# Patient Record
Sex: MALE | Race: WHITE | NOT HISPANIC OR LATINO | Employment: FULL TIME | URBAN - METROPOLITAN AREA
[De-identification: names, ages, dates, MRNs, and addresses within clinical notes are randomized per-mention and may not be internally consistent; named-entity substitution may affect disease eponyms.]

---

## 2021-12-20 ENCOUNTER — NON-PROVIDER VISIT (OUTPATIENT)
Dept: URGENT CARE | Facility: CLINIC | Age: 26
End: 2021-12-20

## 2021-12-20 DIAGNOSIS — Z02.1 PRE-EMPLOYMENT DRUG SCREENING: ICD-10-CM

## 2021-12-20 LAB
AMP AMPHETAMINE: NORMAL
BAR BARBITURATES: NORMAL
BZO BENZODIAZEPINES: NORMAL
COC COCAINE: NORMAL
INT CON NEG: NEGATIVE
INT CON POS: POSITIVE
MDMA ECSTASY: NORMAL
MET METHAMPHETAMINES: NORMAL
MTD METHADONE: NORMAL
OPI OPIATES: NORMAL
OXY OXYCODONE: NORMAL
PCP PHENCYCLIDINE: NORMAL
POC URINE DRUG SCREEN OCDRS: NEGATIVE
THC: NORMAL

## 2021-12-20 PROCEDURE — 80305 DRUG TEST PRSMV DIR OPT OBS: CPT | Performed by: EMERGENCY MEDICINE

## 2022-04-08 ENCOUNTER — OFFICE VISIT (OUTPATIENT)
Dept: URGENT CARE | Facility: PHYSICIAN GROUP | Age: 27
End: 2022-04-08

## 2022-04-08 VITALS
WEIGHT: 132.8 LBS | TEMPERATURE: 98.2 F | OXYGEN SATURATION: 98 % | BODY MASS INDEX: 20.84 KG/M2 | DIASTOLIC BLOOD PRESSURE: 70 MMHG | SYSTOLIC BLOOD PRESSURE: 110 MMHG | RESPIRATION RATE: 16 BRPM | HEIGHT: 67 IN | HEART RATE: 89 BPM

## 2022-04-08 DIAGNOSIS — R10.9 INTERMITTENT ABDOMINAL PAIN: ICD-10-CM

## 2022-04-08 DIAGNOSIS — B96.89 ACUTE BACTERIAL SINUSITIS: ICD-10-CM

## 2022-04-08 DIAGNOSIS — J01.90 ACUTE BACTERIAL SINUSITIS: ICD-10-CM

## 2022-04-08 DIAGNOSIS — R19.7 INTERMITTENT DIARRHEA: ICD-10-CM

## 2022-04-08 DIAGNOSIS — J34.89 NASAL VESTIBULITIS: ICD-10-CM

## 2022-04-08 PROCEDURE — 99204 OFFICE O/P NEW MOD 45 MIN: CPT | Performed by: FAMILY MEDICINE

## 2022-04-08 RX ORDER — DOXYCYCLINE HYCLATE 100 MG
100 TABLET ORAL 2 TIMES DAILY
Qty: 14 TABLET | Refills: 0 | Status: SHIPPED | OUTPATIENT
Start: 2022-04-08 | End: 2022-04-15

## 2022-04-08 NOTE — PROGRESS NOTES
Chief Complaint:    Chief Complaint   Patient presents with   • Congestion     NASAL CONGESTION     • Fatigue   • Abdominal Pain     X has been getting them for the last 2 months about every 2 weeks lasting 4-5 days         History of Present Illness:    Scabbed area just at opening of right nare x 1 week, not getting better. Nasal congestion with purulent mucus from nose x about 5 days, not getting better. Fatigue, sore throat, and cough x 3-4 days - all these symptoms are improving. Fever up to 100 F past 2 days.     For 2 months, having intermittent abdominal pain and diarrhea (can be about 5 episodes/day). GI symptoms can be better x 1 week, then worse x 1 week. No personal or family history of GI problems.      Past Medical History:    History reviewed. No pertinent past medical history.    Past Surgical History:    History reviewed. No pertinent surgical history.    Social History:    Social History     Socioeconomic History   • Marital status: Single     Spouse name: Not on file   • Number of children: Not on file   • Years of education: Not on file   • Highest education level: Not on file   Occupational History   • Not on file   Tobacco Use   • Smoking status: Current Every Day Smoker     Packs/day: 0.03     Years: 1.00     Pack years: 0.03     Types: Cigarettes   • Smokeless tobacco: Never Used   Substance and Sexual Activity   • Alcohol use: Yes     Comment: occ   • Drug use: Yes     Frequency: 14.0 times per week     Types: Marijuana   • Sexual activity: Not on file   Other Topics Concern   • Not on file   Social History Narrative   • Not on file     Social Determinants of Health     Financial Resource Strain: Not on file   Food Insecurity: Not on file   Transportation Needs: Not on file   Physical Activity: Not on file   Stress: Not on file   Social Connections: Not on file   Intimate Partner Violence: Not on file   Housing Stability: Not on file     Family History:    History reviewed. No pertinent  "family history.    Medications:    No current outpatient medications on file prior to visit.     No current facility-administered medications on file prior to visit.     Allergies:    No Known Allergies      Vitals:    Vitals:    22 1156   BP: 110/70   Pulse: 89   Resp: 16   Temp: 36.8 °C (98.2 °F)   TempSrc: Temporal   SpO2: 98%   Weight: 60.2 kg (132 lb 12.8 oz)   Height: 1.702 m (5' 7\")       Physical Exam:    Constitutional: Vital signs reviewed. Appears well-developed and well-nourished. No acute distress.   Eyes: Sclera white, conjunctivae clear.   ENT: TTP right maxillary sinus region. At opening of right nare, septal aspect, scabbed sore with mild surrounding erythema. External ears normal. External auditory canals normal without discharge. TMs translucent and non-bulging. Hearing normal. Lips/teeth are normal. Oral mucosa pink and moist. Posterior pharynx: WNL.  Neck: Neck supple.   Cardiovascular: Regular rate and rhythm. No murmur.  Pulmonary/Chest: Respirations non-labored. Clear to auscultation bilaterally.  Abdomen: Bowel sounds are normal active. Soft, non-distended, and non-tender to palpation.  Musculoskeletal: Normal gait. No muscular atrophy or weakness.  Neurological: Alert and oriented to person, place, and time. Muscle tone normal. Coordination normal.   Skin: No rashes or lesions. Warm, dry, normal turgor.  Psychiatric: Normal mood and affect. Behavior is normal. Judgment and thought content normal.       Medical Decision Makin. Nasal vestibulitis  - doxycycline (VIBRAMYCIN) 100 MG Tab; Take 1 Tablet by mouth 2 times a day for 7 days.  Dispense: 14 Tablet; Refill: 0  - mupirocin (BACTROBAN) 2 % Ointment; APPLY TO SORE OR WOUND 3 TIMES A DAY UNTIL HEALED.  Dispense: 22 g; Refill: 3    2. Acute bacterial sinusitis  - doxycycline (VIBRAMYCIN) 100 MG Tab; Take 1 Tablet by mouth 2 times a day for 7 days.  Dispense: 14 Tablet; Refill: 0    3. Intermittent abdominal pain  - Referral to " Gastroenterology    4. Intermittent diarrhea  - Referral to Gastroenterology      Work note given - excuse for 4/7/22.    Discussed with him DDX, management options, and risks, benefits, and alternatives to treatment plan agreed upon.    Scabbed area just at opening of right nare x 1 week, not getting better. Nasal congestion with purulent mucus from nose x about 5 days, not getting better. Fatigue, sore throat, and cough x 3-4 days - all these symptoms are improving. Fever up to 100 F past 2 days.     For 2 months, having intermittent abdominal pain and diarrhea (can be about 5 episodes/day). GI symptoms can be better x 1 week, then worse x 1 week. No personal or family history of GI problems.    TTP right maxillary sinus region. At opening of right nare, septal aspect, scabbed sore with mild surrounding erythema.    ? etiology of intermittent abdominal pain and diarrhea x 2 months.    Agreeable to medications prescribed and referral to Gastroenterology ordered.    Discussed expected course of duration, time for improvement, and to seek follow-up in Emergency Room, urgent care, or with PCP if getting worse at any time or not improving within expected time frame.

## 2022-04-08 NOTE — LETTER
April 8, 2022         Patient: Keny Castellanos   YOB: 1995   Date of Visit: 4/8/2022           To Whom it May Concern:    Keny Castellanos was seen in my clinic on 4/8/2022.     Please excuse from work for 4/7/22 due to medical condition.    If you have any questions or concerns, please don't hesitate to call.        Sincerely,           Corby Butler M.D.  Electronically Signed

## 2022-07-07 ENCOUNTER — OCCUPATIONAL MEDICINE (OUTPATIENT)
Dept: URGENT CARE | Facility: PHYSICIAN GROUP | Age: 27
End: 2022-07-07
Payer: COMMERCIAL

## 2022-07-07 ENCOUNTER — APPOINTMENT (OUTPATIENT)
Dept: URGENT CARE | Facility: PHYSICIAN GROUP | Age: 27
End: 2022-07-07
Payer: COMMERCIAL

## 2022-07-07 VITALS
OXYGEN SATURATION: 98 % | SYSTOLIC BLOOD PRESSURE: 118 MMHG | DIASTOLIC BLOOD PRESSURE: 80 MMHG | WEIGHT: 130 LBS | BODY MASS INDEX: 24.55 KG/M2 | HEART RATE: 66 BPM | HEIGHT: 61 IN | TEMPERATURE: 97.6 F | RESPIRATION RATE: 14 BRPM

## 2022-07-07 DIAGNOSIS — T15.02XA FOREIGN BODY OF LEFT CORNEA, INITIAL ENCOUNTER: ICD-10-CM

## 2022-07-07 PROCEDURE — 99214 OFFICE O/P EST MOD 30 MIN: CPT | Performed by: PHYSICIAN ASSISTANT

## 2022-07-07 RX ORDER — OFLOXACIN 3 MG/ML
1 SOLUTION/ DROPS OPHTHALMIC 4 TIMES DAILY
Qty: 10 ML | Refills: 0 | Status: SHIPPED | OUTPATIENT
Start: 2022-07-07 | End: 2022-07-14

## 2022-07-07 ASSESSMENT — ENCOUNTER SYMPTOMS
EYE REDNESS: 1
HEADACHES: 0
PHOTOPHOBIA: 1
DOUBLE VISION: 0
FEVER: 0
EYE PAIN: 1
NAUSEA: 0
VOMITING: 0
DIZZINESS: 0
BLURRED VISION: 0

## 2022-07-07 NOTE — LETTER
"EMPLOYEE’S CLAIM FOR COMPENSATION/ REPORT OF INITIAL TREATMENT  FORM C-4    EMPLOYEE’S CLAIM - PROVIDE ALL INFORMATION REQUESTED   First Name  Keny Last Name  Emanuel Birthdate                    1995                Sex  male Claim Number (Insurer’s Use Only)    Home Address  [address] Age  26 y.o. Height  1.549 m (5' 1\") Weight  59 kg (130 lb) [facility]     [place] Zip  [address] Telephone  [phone] (home)    Mailing Address  [address] Zip  [address] Primary Language Spoken  English    Insurer Third-Party   [employer]   Employee's Occupation (Job Title) When Injury or Occupational Disease Occurred      Employer's Name/Company Name  [employer]  Telephone  [phone]    Office Mail Address (Number and Street)   [address]  [place]  Zip  [address]    Date of Injury  7/5/2022               Hours Injury  8:00 PM Date Employer Notified  7/6/2022 Last Day of Work after Injury     or Occupational Disease  7/6/2022 Supervisor to Whom Injury     Reported  Elliott Johnson   Address or Location of Accident (if applicable)  Work [1]   What were you doing at the time of accident? (if applicable)  Using a     How did this injury or occupational disease occur? (Be specific an answer in detail. Use additional sheet if necessary)  Jara flew up from  got in my eyes   If you believe that you have an occupational disease, when did you first have knowledge of the disability and it relationship to your employment?  n/a Witnesses to the Accident  Joey Ball      Nature of Injury or Occupational Disease  Foreign Body  Part(s) of Body Injured or Affected  Eye (L), N/A, N/A    I certify that the above is true and correct to the best of my knowledge and that I have provided this information in order to obtain the benefits of " [place]  I hereby authorize any physician, chiropractor, surgeon, practitioner, or other person, any hospital, including [facility], any medical service organization, any insurance company, or other institution or organization to release to each other, any medical or other information, including benefits paid or payable, pertinent to this injury or disease, except information relative to diagnosis, treatment and/or counseling for AIDS, psychological conditions, alcohol or controlled substances, for which I must give specific authorization.  A Photostat of this authorization shall be as valid as the original.     Date 07/07/2022   Place [facility] Employee’s Original or  *Electronic Signature   THIS REPORT MUST BE COMPLETED AND MAILED WITHIN 3 WORKING DAYS OF TREATMENT   Place  [facility]  Name of Facility  [place]   Date  7/7/2022 Diagnosis and Description of Injury or Occupational Disease  No diagnosis found. Is there evidence the injured employee was under the influence of alcohol and/or another controlled substance at the time of accident?  ? No ? Yes (if yes, please explain)    Hour  9:48 AM   There were no encounter diagnoses. No   Treatment  Patient has a small foreign body to the left cornea.  Was able to get a small diego of metal out using a moist cotton tip applicator.  Upon repeat examination there is still a small diego of metal visualized to the 4 o'clock position of the cornea.  Unable to definitively tell if this is ring rust or another small diego of metal.  Advised the patient to follow-up in the ED so that the foreign body could be removed with better magnification than I have available in clinic.  Patient was agreeable to this plan and will present to the ED for further treatment.  Antibiotic prescription sent to the patient's  pharmacy.  May return to full duty at work.  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  Repeat evaluation in clinic on 7/14/2022.  Have you advised the patient to remain off work five days or     more?    X-Ray Findings      ? Yes Indicate dates:   From   To      From information given by the employee, together with medical evidence, can        you directly connect this injury or occupational disease as job incurred?  Yes ? No If no, is the injured employee capable of:  ? full duty  Yes ? modified duty      Is additional medical care by a physician indicated?  Yes If Modified Duty, Specify any Limitations / Restrictions      Do you know of any previous injury or disease contributing to this condition or occupational disease?  ? Yes ? No (Explain if yes)                          No   Date  7/7/2022 Print Health Care Provider's   Dipika Sanchez P.A.-C. I certify the employer’s copy of  this form was mailed on:   Address  [address] Insurer’s Use Only     [place] Zip  [address]    Provider’s Tax ID Number  [id number] Telephone  Dept: [phone]             Health Care Provider’s Original or Electronic Signature  e-DIPIKA Reed P.A.-C. Degree (MD,DO, DC,PAAna LauraC,APRN)   PA-C      * Complete and attach Release of Information (Form C-4A) when injured employee signs C-4 Form electronically  ORIGINAL - TREATING HEALTHCARE PROVIDER PAGE 2 - INSURER/TPA PAGE 3 - EMPLOYER PAGE 4 - EMPLOYEE             Form C-4 (rev.08/21)           BRIEF DESCRIPTION OF RIGHTS AND BENEFITS  (Pursuant to [place] 616C.050)    Notice of Injury or Occupational Disease (Incident Report Form C-1): If an injury or occupational disease (OD) arises out of and in the course of employment, you must provide written notice to your employer as soon as practicable, but no later than 7 days after the accident or OD. Your employer shall maintain a sufficient supply of the required forms.    Claim for Compensation (Form C-4): If  "medical treatment is sought, the form C-4 is available at the place of initial treatment. A completed \"Claim for Compensation\" (Form C-4) must be filed within 90 days after an accident or OD. The treating physician or chiropractor must, within 3 working days after treatment, complete and mail to the employer, the employer's insurer and third-party , the Claim for Compensation.    Medical Treatment: If you require medical treatment for your on-the-job injury or OD, you may be required to select a physician or chiropractor from a list provided by your workers’ compensation insurer, if it has contracted with an Organization for Managed Care (MCO) or Preferred Provider Organization (PPO) or providers of health care. If your employer has not entered into a contract with an MCO or PPO, you may select a physician or chiropractor from the Panel of Physicians and Chiropractors. Any medical costs related to your industrial injury or OD will be paid by your insurer.    Temporary Total Disability (TTD): If your doctor has certified that you are unable to work for a period of at least 5 consecutive days, or 5 cumulative days in a 20-day period, or places restrictions on you that your employer does not accommodate, you may be entitled to TTD compensation.    Temporary Partial Disability (TPD): If the wage you receive upon reemployment is less than the compensation for TTD to which you are entitled, the insurer may be required to pay you TPD compensation to make up the difference. TPD can only be paid for a maximum of 24 months.    Permanent Partial Disability (PPD): When your medical condition is stable and there is an indication of a PPD as a result of your injury or OD, within 30 days, your insurer must arrange for an evaluation by a rating physician or chiropractor to determine the degree of your PPD. The amount of your PPD award depends on the date of injury, the results of the PPD evaluation, your age and " wage.    Permanent Total Disability (PTD): If you are medically certified by a treating physician or chiropractor as permanently and totally disabled and have been granted a PTD status by your insurer, you are entitled to receive monthly benefits not to exceed 66 2/3% of your average monthly wage. The amount of your PTD payments is subject to reduction if you previously received a lump-sum PPD award.    Vocational Rehabilitation Services: You may be eligible for vocational rehabilitation services if you are unable to return to the job due to a permanent physical impairment or permanent restrictions as a result of your injury or occupational disease.    Transportation and Per Donna Reimbursement: You may be eligible for travel expenses and per donna associated with medical treatment.    Reopening: You may be able to reopen your claim if your condition worsens after claim closure.     Appeal Process: If you disagree with a written determination issued by the insurer or the insurer does not respond to your request, you may appeal to the Department of Administration, , by following the instructions contained in your determination letter. You must appeal the determination within 70 days from the date of the determination letter at [address], [address], [place], [place] [address], or [address], [place] [address]. If you disagree with the  decision, you may appeal to the Department of Administration, . You must file your appeal within 30 days from the date of the  decision letter at [address], [address], [place] [address], or [address] [facility] [address], [place] [address]. If you disagree with a decision of an , you may file a petition for judicial review with the District Court. You must do so within 30 days of the Appeal Officer’s decision. You may be represented by an   at your own expense or you may contact the NA for possible representation.    [place]  for Injured Workers (NAIW): If you disagree with a  decision, you may request that NAIW represent you without charge at an  Hearing. For information regarding denial of benefits, you may contact the NA at: [address], [address], [place] [address], [phone], or [address], [place] [address], [phone]    To File a Complaint with the Division: If you wish to file a complaint with the  of the Division of Industrial Relations (DIR),  please contact the Workers’ Compensation Section, [address], [address], [place], [place] [address], telephone [phone]) [phone], or [address], [address], [place], [place] [address], telephone [phone]) [phone].    For assistance with Workers’ Compensation Issues: You may contact the [facility] Office for Consumer Health Assistance, [address], [address], [place], [place] [address], Toll Free 1-140.229.7711, Web site: http://[facility].[place].gov/Programs/MILTON E-mail: [email or web address]              __________________________________________________________________                                    ____07/07/2022______            Employee Name / Signature                                                                                                                            Date                                                                                                                                                                                                                              D-2 (rev. 10/20)

## 2022-07-07 NOTE — PROGRESS NOTES
"Subjective     Keny Castellanos is a 26 y.o. male who presents with Work-Related Injury (Left eye injury - doi 07/05/2022 - [employer] )      HPI:  DOI: 7/5/22  COLEMAN:  This is a very pleasant 26-year-old male presented to the clinic after sustaining a work-related injury.  Patient works for [employer].  He was grinding a piece of metal when a small diego of metal got into his left eye.  He was wearing safety goggles.  States he has had continued pain and eye redness over the last 2 days.  Describes increased tear production and photophobia.  Denies any significant visual change.  Has tried flushing of the eye at home with no improvement.  No contact use.  States he used to wear glasses for vision however is due for a new prescription.  No previous injury or trauma.  No second job.       Review of Systems   Constitutional: Negative for fever.   Eyes: Positive for photophobia, pain and redness. Negative for blurred vision and double vision.   Gastrointestinal: Negative for nausea and vomiting.   Neurological: Negative for dizziness and headaches.     PMH:   No pertinent past medical history to this problem  MEDS:  Medications were reviewed in EMR  ALLERGIES:  Allergies were reviewed in EMR  FH:   No pertinent family history to this problem           Objective     /80   Pulse 66   Temp 36.4 °C (97.6 °F) (Temporal)   Resp 14   Ht 1.549 m (5' 1\")   Wt 59 kg (130 lb)   SpO2 98%   BMI 24.56 kg/m²      Physical Exam    Constitutional: Pt is oriented to person, place, and time.  Appears well-developed and well-nourished. No distress.   Eyes: Left eye: Conjunctiva injected.  There is a small visible foreign body to 4 o'clock position of the cornea.  Eye was examined under fluorescein stain.  Foreign body appreciated.  No other corneal abrasions.  EOMs full intact and pain-free.  PERRLA.  Visual acuity: OD: 20/30, OS 20/50, OU 20/25  Cardiovascular: Normal rate.    Pulmonary/Chest: Effort normal. "   Neurological: Pt is alert and oriented to person, place, and time. Coordination normal.   Skin: Skin is warm. Pt is not diaphoretic. No erythema.   Psychiatric: Pt has a normal mood and affect.  Behavior is normal.                  Assessment & Plan        1. Foreign body of left cornea, initial encounter    - ofloxacin (OCUFLOX) 0.3 % Solution; Administer 1 Drop into the left eye 4 times a day for 7 days.  Dispense: 10 mL; Refill: 0    Patient has a small foreign body to the left cornea.  Was able to get a small diego of metal out using a moist cotton tip applicator.  Upon repeat examination there is still a small diego of metal visualized to the 4 o'clock position of the cornea.  Unable to definitively tell if this is ring rust or another small diego of metal.  Advised the patient to follow-up in the ED so that the foreign body could be removed with better magnification than I have available in clinic.  Patient was agreeable to this plan and will present to the ED for further treatment.  Antibiotic prescription sent to the patient's pharmacy.  May return to full duty at work.  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  Repeat evaluation in clinic on 7/14/2022.    Differential diagnosis, natural history, supportive care, and indications for immediate follow-up discussed at length.

## 2022-07-07 NOTE — LETTER
[address] [place] [address]  Phone:  [phone] - Fax:  [phone]   [facility] Progress Report and Disability Certification  Date of Service: 7/7/2022   No Show:  No  Date / Time of Next Visit: 7/14/2022   Claim Information   Patient Name: Keny Castellanos  Claim Number:     Employer: [employer] Date of Injury: 7/5/2022     Insurer / TPA: Cindy Neal ID / SSN:     Occupation:  Diagnosis: There were no encounter diagnoses.    Medical Information   Related to Industrial Injury? Yes   Subjective Complaints:  HPI:  DOI: 7/5/22  COLEMAN:  This is a very pleasant 26-year-old male presented to the clinic after sustaining a work-related injury.  Patient works for [employer].  He was grinding a piece of metal when a small diego of metal got into his left eye.  He was wearing safety goggles.  States he has had continued pain and eye redness over the last 2 days.  Describes increased tear production and photophobia.  Denies any significant visual change.  Has tried flushing of the eye at home with no improvement.  No contact use.  States he used to wear glasses for vision however is due for a new prescription.  No previous injury or trauma.  No second job.   Objective Findings: Constitutional: Pt is oriented to person, place, and time.  Appears well-developed and well-nourished. No distress.   Eyes: Left eye: Conjunctiva injected.  There is a small visible foreign body to 4 o'clock position of the cornea.  Eye was examined under fluorescein stain.  Foreign body appreciated.  No other corneal abrasions.  EOMs full intact and pain-free.  PERRLA.  Visual acuity: OD: 20/30, OS 20/50, OU 20/25  Cardiovascular: Normal rate.    Pulmonary/Chest: Effort normal.   Neurological: Pt is alert and oriented to person, place, and time. Coordination normal.   Skin: Skin is warm. Pt is not diaphoretic. No erythema.   Psychiatric: Pt  has a normal mood and affect.  Behavior is normal.      Pre-Existing Condition(s):     Assessment:   Initial Visit    Status: Additional Care Required  Permanent Disability:No    Plan: MedicationTransfer Care    Diagnostics:      Comments:       Disability Information   Status: Released to Full Duty    From:  7/7/2022  Through: 7/14/2022 Restrictions are:     Physical Restrictions   Sitting:    Standing:    Stooping:    Bending:      Squatting:    Walking:    Climbing:    Pushing:      Pulling:    Other:    Reaching Above Shoulder (L):   Reaching Above Shoulder (R):       Reaching Below Shoulder (L):    Reaching Below Shoulder (R):      Not to exceed Weight Limits   Carrying(hrs):   Weight Limit(lb):   Lifting(hrs):   Weight  Limit(lb):     Comments: Patient has a small foreign body to the left cornea.  Was able to get a small diego of metal out using a moist cotton tip applicator.  Upon repeat examination there is still a small diego of metal visualized to the 4 o'clock position of the cornea.  Unable to definitively tell if this is ring rust or another small diego of metal.  Advised the patient to follow-up in the ED so that the foreign body could be removed with better magnification than I have available in clinic.  Patient was agreeable to this plan and will present to the ED for further treatment.  Antibiotic prescription sent to the patient's pharmacy.  May return to full duty at work.  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  Repeat evaluation in clinic on 7/14/2022.    Repetitive Actions   Hands: i.e. Fine Manipulations from Grasping:     Feet: i.e. Operating Foot Controls:     Driving / Operate Machinery:     Health Care Provider’s Original or Electronic Signature  Mark Sanchez P.A.-C. Health Care Provider’s Original or Electronic Signature    Antonio Travis MD         Clinic Name / Location: [address] [address] Clinic Phone Number: Dept:  [phone]   Appointment Time: 9:25 Am Visit Start Time: 9:48 AM   Check-In Time:  9:27 Am Visit Discharge Time: 10:30 AM   Original-Treating Physician or Chiropractor    Page 2-Insurer/TPA    Page 3-Employer    Page 4-Employee

## 2022-07-08 DIAGNOSIS — T15.02XA FOREIGN BODY OF LEFT CORNEA, INITIAL ENCOUNTER: ICD-10-CM
